# Patient Record
(demographics unavailable — no encounter records)

---

## 2024-11-20 NOTE — PHYSICAL EXAM
Rx Refill Note  Requested Prescriptions     Pending Prescriptions Disp Refills    warfarin (COUMADIN) 4 MG tablet [Pharmacy Med Name: WARFARIN SOD 4MG TABLETS] 64 tablet 0     Sig: Take one tablet by mouth daily on Tuesday Thursday and Saturday and one half tablet by mouth all other days or as directed      Last office visit with prescribing clinician: 8/14/2024   Last telemedicine visit with prescribing clinician: Visit date not found   Next office visit with prescribing clinician: 2/26/2025     Anticoagulation Visit (09/09/2024)     Rhina Chilel LPN  09/11/24, 16:01 EDT   [Normal Appearance] : normal appearance [Well Groomed] : well groomed [General Appearance - In No Acute Distress] : no acute distress [Edema] : no peripheral edema [Respiration, Rhythm And Depth] : normal respiratory rhythm and effort [Exaggerated Use Of Accessory Muscles For Inspiration] : no accessory muscle use [Abdomen Soft] : soft [Abdomen Tenderness] : non-tender [Costovertebral Angle Tenderness] : no ~M costovertebral angle tenderness [Urinary Bladder Findings] : the bladder was normal on palpation [Normal Station and Gait] : the gait and station were normal for the patient's age [] : no rash [No Focal Deficits] : no focal deficits [Oriented To Time, Place, And Person] : oriented to person, place, and time [Affect] : the affect was normal [Mood] : the mood was normal [No Palpable Adenopathy] : no palpable adenopathy

## 2024-11-26 NOTE — PHYSICAL EXAM
[Alert] : alert [No Acute Distress] : no acute distress [Well Nourished] : well nourished [Well Developed] : well developed [Healthy Appearance] : healthy appearance [Normal Voice/Communication] : normal voice communication [No Respiratory Distress] : no respiratory distress [Normal Rate and Effort] : normal respiratory rhythm and effort [Oriented x3] : oriented to person, place, and time [Normal Insight/Judgement] : insight and judgment were intact [Normal Affect] : the affect was normal [Normal Mood] : the mood was normal [Recent Memory Normal] : recent memory was not impaired [Remote Memory Normal] : remote memory was not impaired [Restricted in physically strenuous activity but ambulatory and able to carry out work of a light or sedentary nature] : Restricted in physically strenuous activity but ambulatory and able to carry out work of a light or sedentary nature, e.g., light house work, office work

## 2024-11-27 NOTE — ASSESSMENT
[Other: _____] : [unfilled] [FreeTextEntry1] : This is a 70-year-old male with a PMH of BPH with urinary retention, HLD who presents to IR for consultation for PAE.  # Symptomatic BPH with LUTS / Obstructive Symptoms - states BPH symptoms including weak stream, frequency and nocturia with recent retention requiring orlando catheter and 2 failed TOV - referred for outpatient PAE by Dr Abdul. -IPSS score 4 in office today - PAE procedural risks, benefits & alternatives were discussed at great length with Mr. MICHAEL, inclusive of possible non-targeted embolization (bladder / penis / rectum), post embolization syndrome which may be comprised of flu-like s/s, low grade fever, n/v, pelvic discomfort lasting up to 1 week, penile tip burning after orlando catheter removal which may last a few days or 1-2 episodes of gross hematuria with or without clot passage that may occur monthly as the prostate remodels - performed with sedation from the anesthesia team - Your LUTS may worsen the first week post PAE 2/2 the inflammatory phase of healing - You may also have transitory blood-tinged urine, semen or bowel movements initially - CT abd/pelvis on 11/5 reviewed to evaluate pelvic vasculature for PAE planning - prostate volume measured 129.59 cc - You will be discharged home after a 1- 2 hr PACU stay - an access site closure device (MRI compatible permanent Celt or absorbable Mynx) may be utilized to aid in mitigating postprocedural access site bleeding - You will continue with indwelling orlando catheter with post op f/u & mgmt with Dr. Abdul  - Pending bmp results, Pyridium 100 mg may be ordered x 5 days prn should you experience postprocedural penile / urethral burning, Medrol dose pack or Ibuprofen 600 mg q8h x 5 days may be ordered for postprocedural inflammation / pressure - to be ordered to [facility] DOS by inpatient team - pt denies hx of CKD / renal dysfunction hx - no Ibuprofen post op 2/2 Celebrex - BPH medication regimen & repeat imaging post PAE as per your urologist - IR follow up consults with IPSS assessment s/p PAE will be 1 month post PAE - additional follow ups to be determined as clinically indicated - bkg office to arrange PAE - pt to get cbc, bmp, inr per PST -ok to remain on ASA 81 mg  -Need PST for procedure  #Leukocytosis -WBC 22.43 in ED 11/23/24.  -11/23/24  Urine culture -no growth - Pt reports/ denies fever, chills, cloudy urine -Currently on 10 day course of empiric antibiotics -repeat and trend per PST  #Hyponatremia - ON 11/23 -Repeat and trend per PST  #HTN - controlled off meds - Followed by Dr. Lisker - Last echo EF 59%, 6/2024 - Stress echo normal 6/2024     Mr. HERNANDES comprehension was confirmed & all questions were asked & answered to their satisfaction. IR contact information was reviewed with the pt should there be any questions, issues, or concerns, to be addressed.  IR contact information was provided to the pt should there be any additional questions or concerns that may be addressed & that the booking office will call to schedule PSTs & procedure

## 2024-11-27 NOTE — REASON FOR VISIT
[Consultation] : a consultation visit [Home] : at home, [unfilled] , at the time of the visit. [Medical Office: ([facility])___] : at the medical office located in  [Patient] : the patient [FreeTextEntry1] : PAE

## 2024-11-27 NOTE — REASON FOR VISIT
[Consultation] : a consultation visit [Home] : at home, [unfilled] , at the time of the visit. [Medical Office: ([facility])___] : at the medical office located in  [Patient] : the patient [FreeTextEntry1] : PAE

## 2024-11-27 NOTE — ASSESSMENT
[Other: _____] : [unfilled] [FreeTextEntry1] : This is a 70-year-old male with a PMH of BPH with urinary retention, HLD who presents to IR for consultation for PAE.  # Symptomatic BPH with LUTS / Obstructive Symptoms - states BPH symptoms including weak stream, frequency and nocturia with recent retention requiring orlando catheter and 2 failed TOV - referred for outpatient PAE by Dr Abdul. -IPSS score 4 in office today - PAE procedural risks, benefits & alternatives were discussed at great length with Mr. MICHAEL, inclusive of possible non-targeted embolization (bladder / penis / rectum), post embolization syndrome which may be comprised of flu-like s/s, low grade fever, n/v, pelvic discomfort lasting up to 1 week, penile tip burning after orlando catheter removal which may last a few days or 1-2 episodes of gross hematuria with or without clot passage that may occur monthly as the prostate remodels - performed with sedation from the anesthesia team - Your LUTS may worsen the first week post PAE 2/2 the inflammatory phase of healing - You may also have transitory blood-tinged urine, semen or bowel movements initially - CT abd/pelvis on 11/5 reviewed to evaluate pelvic vasculature for PAE planning - prostate volume measured 129.59 cc - You will be discharged home after a 1- 2 hr PACU stay - an access site closure device (MRI compatible permanent Celt or absorbable Mynx) may be utilized to aid in mitigating postprocedural access site bleeding - You will continue with indwelling orlando catheter with post op f/u & mgmt with Dr. Abdul  - Pending bmp results, Pyridium 100 mg may be ordered x 5 days prn should you experience postprocedural penile / urethral burning, Medrol dose pack or Ibuprofen 600 mg q8h x 5 days may be ordered for postprocedural inflammation / pressure - to be ordered to [facility] pharmacy DOS by inpatient team - pt denies hx of CKD / renal dysfunction hx - no Ibuprofen post op 2/2 Celebrex - BPH medication regimen & repeat imaging post PAE as per your urologist - IR follow up consults with IPSS assessment s/p PAE will be 1 month post PAE - additional follow ups to be determined as clinically indicated - bkg office to arrange PAE - pt to get cbc, bmp, inr per PST -ok to remain on ASA 81 mg  -Need PST for procedure  #Leukocytosis -WBC 22.43 in ED 11/23/24.  -11/23/24  Urine culture -no growth - Pt reports/ denies fever, chills, cloudy urine -Currently on 10 day course of empiric antibiotics -repeat and trend per PST  #Hyponatremia - ON 11/23 -Repeat and trend per PST  #HTN - controlled off meds - Followed by Dr. Lisker - Last echo EF 59%, 6/2024 - Stress echo normal 6/2024     Mr. HERNANDES comprehension was confirmed & all questions were asked & answered to their satisfaction. IR contact information was reviewed with the pt should there be any questions, issues, or concerns, to be addressed.  IR contact information was provided to the pt should there be any additional questions or concerns that may be addressed & that the booking office will call to schedule PSTs & procedure

## 2024-11-27 NOTE — HISTORY OF PRESENT ILLNESS
[0] : ~His/Her~ pain was 0 out of 10 [FreeTextEntry1] : This is a 70-year-old male with a PMH of CAD, HTN, BPH with urinary retention, HLD who presents to IR for consultation for PAE. Referred for outpatient PAE by Dr Abdul.  Pt states approximately 1 month ago he developed hematuria and UTI. He presented to ED on 11/10 for urinary retention and had orlando and then again on 11/23. He reports chronic symptoms of weak stream, nocturia, and frequency. He received a course of antibiotics x 10 days on 11/10 and then again on 11/23 in ED for fever and leukocytosis in ED.  He was placed on oxybutynin for urinary spasms since orlando was placed. Currently denies fever, chills, cloudy urine, hernan bleeding.

## 2025-01-16 NOTE — PHYSICAL EXAM
[Normal Appearance] : normal appearance [Well Groomed] : well groomed [General Appearance - In No Acute Distress] : no acute distress [Edema] : no peripheral edema [Respiration, Rhythm And Depth] : normal respiratory rhythm and effort [Exaggerated Use Of Accessory Muscles For Inspiration] : no accessory muscle use [Abdomen Soft] : soft [Abdomen Tenderness] : non-tender [Costovertebral Angle Tenderness] : no ~M costovertebral angle tenderness [Urinary Bladder Findings] : the bladder was normal on palpation [Normal Station and Gait] : the gait and station were normal for the patient's age [] : no rash [No Focal Deficits] : no focal deficits [Oriented To Time, Place, And Person] : oriented to person, place, and time [Affect] : the affect was normal [Mood] : the mood was normal [No Palpable Adenopathy] : no palpable adenopathy [Chaperone Present] : A chaperone was present in the examining room during all aspects of the physical examination [FreeTextEntry2] : rhys

## 2025-01-27 NOTE — REVIEW OF SYSTEMS
[As Noted in HPI] : as noted in HPI [Negative] : Heme/Lymph [FreeTextEntry5] : + coronary artery calcification

## 2025-01-27 NOTE — ASSESSMENT
[Other: _____] : [unfilled] [FreeTextEntry1] : This is a 71 y/o male with hx of BPH with urinary retention, HLD who presents to IR today for f/u s/p leif PAE on 12/27/24.  IPSS Score today: 0 - was 4 pre PAE  # BPH with LUTS / Obstructive Symptoms - states BPH symptoms including weak stream, frequency and nocturia with recent retention requiring orlando catheter and 2 failed TOV - referred for outpatient PAE by Dr Abdul. - now s/p leif PAE on 12/27/24 -  Mr Hernandes reports orlando was removed 2 wks post PAE & he developed retention & orlando was reinserted in the ED & he developed hematuria & clots - was admitted from 1/10 - 1/13 for CBI - was also txd for UTI. Orlando was removed by Dr Abdul on 1/16 - Today, Mr Hernandes states he is doing well & reports frequency, nocturia & weak stream have resolved since hospital discharge - BPH medication modification, PSA monitoring & imaging as per your urologist - continue with consistent follow ups with your urologist - next f/u with Dr Abdul is 2/13 - no further IR follow ups are required given interval improvement in LUTS / successful orlando removal     Mr. HERNANDES's comprehension was confirmed & all questions were asked & answered to his satisfaction. IR contact information was reviewed with the pt should there be any questions, issues, or concerns, to be addressed.  Above case & plan discussed with Dr Brasher

## 2025-01-27 NOTE — HISTORY OF PRESENT ILLNESS
[0] : ~His/Her~ pain was 0 out of 10 [Other Location: e.g. School (Enter Location, City,State)___] : at [unfilled], at the time of the visit. [Medical Office: ([facility])___] : at the medical office located in  [Verbal consent obtained from patient] : the patient, [unfilled] [FreeTextEntry1] : This is a 69 y/o male with hx of BPH with urinary retention, HLD who presents to IR today for f/u s/p leif PAE on 12/27/24.  Today, Mr Iraheta states he is doing well & reports frequency, nocturia & weak stream have resolved since hospital discharge. States next f/u with Dr Abdul is 2/13.   Mr Iraheta reports oliveira was removed 2 wks post PAE & he developed retention & oliveira was reinserted in the ED & he developed hematuria & clots - was admitted from 1/10 - 1/13 for CBI - was also txd for UTI. Oliveira was removed by Dr Abdul on 1/16.  Developed the flu on 1/14 which resolved after tx with Tamiflu from Urgent Care through 1/18.    Dr. Abdul

## 2025-01-27 NOTE — HISTORY OF PRESENT ILLNESS
[0] : ~His/Her~ pain was 0 out of 10 [Other Location: e.g. School (Enter Location, City,State)___] : at [unfilled], at the time of the visit. [Medical Office: ([facility])___] : at the medical office located in  [Verbal consent obtained from patient] : the patient, [unfilled] [FreeTextEntry1] : This is a 69 y/o male with hx of BPH with urinary retention, HLD who presents to IR today for f/u s/p leif PAE on 12/27/24.  Today, Mr Iraheta states he is doing well & reports frequency, nocturia & weak stream have resolved since hospital discharge. States next f/u with Dr Abdul is 2/13.   Mr Iraheta reports oliveira was removed 2 wks post PAE & he developed retention & oliveira was reinserted in the ED & he developed hematuria & clots - was admitted from 1/10 - 1/13 for CBI - was also txd for UTI. Oliveira was removed by Dr Abdul on 1/16.  Developed the flu on 1/14 which resolved after tx with Tamiflu from Urgent Care through 1/18.    Dr. Abdul

## 2025-01-27 NOTE — ASSESSMENT
[Other: _____] : [unfilled] [FreeTextEntry1] : This is a 69 y/o male with hx of BPH with urinary retention, HLD who presents to IR today for f/u s/p leif PAE on 12/27/24.  IPSS Score today: 0 - was 4 pre PAE  # BPH with LUTS / Obstructive Symptoms - states BPH symptoms including weak stream, frequency and nocturia with recent retention requiring orlando catheter and 2 failed TOV - referred for outpatient PAE by Dr Abdul. - now s/p leif PAE on 12/27/24 -  Mr Hernandes reports orlando was removed 2 wks post PAE & he developed retention & orlando was reinserted in the ED & he developed hematuria & clots - was admitted from 1/10 - 1/13 for CBI - was also txd for UTI. Orlando was removed by Dr Abdul on 1/16 - Today, Mr Hernandes states he is doing well & reports frequency, nocturia & weak stream have resolved since hospital discharge - BPH medication modification, PSA monitoring & imaging as per your urologist - continue with consistent follow ups with your urologist - next f/u with Dr Abdul is 2/13 - no further IR follow ups are required given interval improvement in LUTS / successful orlando removal     Mr. HERNANDES's comprehension was confirmed & all questions were asked & answered to his satisfaction. IR contact information was reviewed with the pt should there be any questions, issues, or concerns, to be addressed.  Above case & plan discussed with Dr Brasher

## 2025-01-27 NOTE — DATA REVIEWED
[FreeTextEntry1] : 11/23/24: WBC 22.43 Hgb 12.5 Hct 37.8 Plt 227 Na 131 K 4.0 BUN 14 Cr 0.80 Left ovarian solid mass, encompassing the entire ovary with a smooth white capsule.  Normal appearing uterine fundus, bilateral fallopian tubes and right ovary.  No evidence of carcinomatosis or ascites.  Normal upper abdominal survey, normal omentum and appendix.  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

## 2025-01-30 NOTE — DISCUSSION/SUMMARY
[FreeTextEntry1] : He is a 70-year-old with recently diagnosed coronary atherosclerosis based on CTA and recent  issues, now resolved.   ECG shows sinus rhythm with RBBB CAD:  No angina. Chronic RBBB. Reduced exercise. Prior: nuclear stress showed no sign of ischemia. He is on an excellent medical regimen including aspirin and high-dose statin.  LDL was at goal. To see Dr. Holliday for blood work. See me anually. [EKG obtained to assist in diagnosis and management of assessed problem(s)] : EKG obtained to assist in diagnosis and management of assessed problem(s)

## 2025-01-30 NOTE — HISTORY OF PRESENT ILLNESS
[FreeTextEntry1] : Had  issues including bleeding causing him to stop aspirin. Now back on asa for 2 weeks. 30 min on the bike daily. No chest pain or dyspena. LDL was at goal.  Prior: Bike at home, down to 20 min almost daily. at the gym does rowing 30 min. no chest pain or dyspnea. LDL 39 No bleeding or bruising. In Dec went to the ER with episodes of low BP. Seen by neurologist. No issues diagnosed.  Prior: He is feeling well. Bicycle daily 30 at high resistance. No chest apoin or dyspnea LDL 50  Prior: Dear Sebastian, Thank you for referring him for cardiovascular evaluation and management.  He is a 68-year-old with recently diagnosed coronary disease based on elevated coronary calcium score and was referred for cardiovascular valuation. He reports feeling okay overall and is able to go about his usual activities without any chest pains or shortness of breath.  He is limited by severe knee arthritic pain. He has no history of diabetes mellitus, smoking, family history of premature coronary artery disease, renal insufficiency or stroke.

## 2025-02-08 NOTE — HISTORY OF PRESENT ILLNESS
[FreeTextEntry1] : 70-year-old male CPE History of coronary artery calcification, elevated PSA, hyperlipidemia, high glucose, and colon polyp [de-identified] :  See "CC" sheet

## 2025-02-08 NOTE — COUNSELING
[de-identified] : Discussed with patient importance of healthy diet, regular exercise (> 2.5 hours/week), and maintaining healthy weight.

## 2025-02-08 NOTE — HEALTH RISK ASSESSMENT
[Fair] : ~his/her~ current health as fair  [Good] : ~his/her~  mood as  good [Intercurrent hospitalizations] : was admitted to the hospital  [Intercurrent Urgi Care visits] : went to urgent care [Yes] : Yes [1 or 2 (0 pts)] : 1 or 2 (0 points) [Never (0 pts)] : Never (0 points) [No] : In the past 12 months have you used drugs other than those required for medical reasons? No [No falls in past year] : Patient reported no falls in the past year [0] : 2) Feeling down, depressed, or hopeless: Not at all (0) [de-identified] : occ. [de-identified] : low fat/salt [de-identified] : bike/walk [[id number]] : 0 [Never] : Never [Patient reported colonoscopy was abnormal] : Patient reported colonoscopy was abnormal [HIV test declined] : HIV test declined [Language] : denies difficulty with language [Change in mental status noted] : No change in mental status noted [Behavior] : denies difficulty with behavior [Learning/Retaining New Information] : denies difficulty learning/retaining new information [Handling Complex Tasks] : denies difficulty handling complex tasks [Reasoning] : denies difficulty with reasoning [Spatial Ability and Orientation] : denies difficulty with spatial ability and orientation [With Family] : lives with family [None] : None [College] : College [] :  [Feels Safe at Home] : Feels safe at home [Fully functional (bathing, dressing, toileting, transferring, walking, feeding)] : Fully functional (bathing, dressing, toileting, transferring, walking, feeding) [Fully functional (using the telephone, shopping, preparing meals, housekeeping, doing laundry, using] : Fully functional and needs no help or supervision to perform IADLs (using the telephone, shopping, preparing meals, housekeeping, doing laundry, using transportation, managing medications and managing finances) [Smoke Detector] : smoke detector [Seat Belt] :  uses seat belt [Designated Healthcare Proxy] : Designated healthcare proxy [Name: ___] : Health Care Proxy's Name: [unfilled]  [Relationship: ___] : Relationship: [unfilled]

## 2025-03-05 NOTE — HISTORY OF PRESENT ILLNESS
[de-identified] : REYES HERNANDES 70 year old male presents for evaluation of B/L knee OA. He had B/L Euflexxa injections in September. He is doing his home exercises recently started going to the gym and uses the stationary bike. He wants another round of B/L Euflexxa injections.

## 2025-03-05 NOTE — CONSULT LETTER
[Dear  ___] : Dear  [unfilled], [Consult Letter:] : I had the pleasure of evaluating your patient, [unfilled]. [Please see my note below.] : Please see my note below. [Consult Closing:] : Thank you very much for allowing me to participate in the care of this patient.  If you have any questions, please do not hesitate to contact me. [Sincerely,] : Sincerely, [FreeTextEntry2] : FRANCESCO FRANCO

## 2025-03-05 NOTE — ADDENDUM
[FreeTextEntry1] : This note was written by Marcellus Hobson on 03/05/2025 acting as scribe for Dr. Kenneth Quiros M.D.   I, Dr. Kenneth Quiros, have read and attest that all the information, medical decision making and discharge instructions within are true and accurate.

## 2025-03-05 NOTE — DISCUSSION/SUMMARY
[de-identified] : Discussed at length the nature of the patients condition. Their bilateral knee symptoms appear secondary to degenerative arthritis. We will continue nonoperative treatment. Since he's had a good prior response, we will schedule for bilateral knee viscosupplementation injections. In the interim, I suggested he continue with home exercise, weight management, and Tylenol prn. They can continue activities as tolerated.

## 2025-03-05 NOTE — PHYSICAL EXAM
[de-identified] : General appearance: well-nourished and hydrated, pleasant, alert and oriented x 3, cooperative. HEENT: Normocephalic, EOM intact, Nasal septum midline, Oral cavity clear, External auditory canal clear. Cardiovascular: no apparent abnormalities, no lower leg edema, no varicosities, pedal pulses are palpable. Lymphatics Lymph nodes: none palpated, Lymphedema: not present. Neurologic: sensation is normal, no muscle weakness in upper lower extremities, patella tendon reflexes intact. Dermatologic no apparent skin lesions, moist, warm, no rash. Spine: cervical spine appears normal and moves freely, thoracic spine appears normal and moves freely, lumbosacral spine appears normal and moves freely. Gait: nonantalgic.  Right knee Inspection: no effusion, no erythema. Wounds: none. Alignment: 10 degrees of varus alignment. Palpation: no tenderness on palpation. ROM active (in degrees): 0-130 with crepitus. Ligamentous laxity: all ligaments appear stable, negative ant. drawer test, negative post. drawer test, stable to varus stress test, stable to valgus stress test. negative Lachman's test, negative pivot shift test Meniscal Test: negative McMurrays, negative Tani. Patellofemoral Alignment Test: Q angle-, normal. Muscle Test: good quad strength.  Left knee Inspection: no effusion or erythema. Wounds: none. Alignment: 10 degrees varus alignment. Palpation: no tenderness on palpation. ROM active (in degrees):  0-130 with crepitus. Ligamentous laxity: all ligaments appear stable, negative ant. drawer test, negative post. drawer test, stable to varus stress test, stable to valgus stress test. negative Lachman's test, negative pivot shift test Meniscal Test: negative McMurrays, negative Tani. Patellofemoral Alignment Test: Q angle-, normal. Muscle Test: good quad strength. [de-identified] : Right knee x-rays, standing AP/Lateral and Merchant films, and 45 degree PA standing view, taken at the office today show diffuse tricompartmental degenerative arthritis, tibial subluxation, varus deformity, medial joint space narrowing, medial bone on bone, sclerosis, marginal osteophytes, patella sits centrally with patellofemoral joint space narrowing, Kellgren and Oumar grade 4.  Left knee x-rays, standing AP/Lateral and Merchant films, and 45 degree PA standing view, taken at the office today show diffuse tricompartmental degenerative arthritis, tibial subluxation, varus deformity, medial joint space narrowing, medial bone on bone, sclerosis, marginal osteophytes, patella sits centrally with patellofemoral joint space narrowing, Kellgren and Oumar grade 4.

## 2025-03-25 NOTE — ASSESSMENT
[FreeTextEntry1] : wax- Rx:  Debrox was prescribed and  is to be placed in both ears on a routine basis to keep them free of wax. Routine debridement suggested to keep the ears free of wax.  bilateral sensorineural hearing loss-cleared for hearing aids Assym SNHL Rec MRI of IAC's  tinnitus acupucture  f/u 4-6 months

## 2025-03-25 NOTE — HISTORY OF PRESENT ILLNESS
[de-identified] : 69 yo Bilat Tinnitus L>R no noted HL or vertigo No h/o trauma no other modifying factors no other nasal or throat complaints

## 2025-03-25 NOTE — DATA REVIEWED
[de-identified] : Hearing Test performed to evaluate the extent of hearing loss and  to explain pt's symptoms  was personally reviewed and revealed Tymps-wnl Hearing-bilat SNHL L>R

## 2025-04-02 NOTE — PROCEDURE
[de-identified] : Euflexxa # 1 Bilateral knee Discussed at length with the patient the planned Euflexxa injection. The risks, benefits, convalescence and alternatives were reviewed. The possible side effects discussed included but were not limited to: pain, swelling, heat and redness. There symptoms are generally mild but if they are extensive then contact the office. Giving pain relievers by mouth such as NSAIDs or Tylenol can generally treat the reactions to Euflexxa. Rare cases of infection have been noted. Rash, hives and itching may occur post injection. If you have muscle pain or cramps, flushing and or swelling of the face, rapid heart beat, nausea, dizziness, fever, chills, headache, difficulty breathing, swelling in the arms or legs, or have a prickly feeling of your skin, contact a health care provider immediately.  Following this discussion, the knee was prepped with betadine and under sterile condition the Euflexxa injection was performed with a 22 gauge needle. The needle was introduced into the joint, aspiration was performed to ensure intra-articular placement and the medication was injected. Upon withdrawal of the needle the site was cleaned with alcohol and a bandaid applied. The patient tolerated the injection well and there were no adverse effects. Post injection instructions included no strenuous activity for 24 hours, cryotherapy and if there are any adverse effects to contact the office.

## 2025-04-10 NOTE — PROCEDURE
[de-identified] : Euflexxa # 2 Bilateral Knee Discussed at length with the patient the planned Euflexxa injection. The risks, benefits, convalescence and alternatives were reviewed. The possible side effects discussed included but were not limited to: pain, swelling, heat and redness. There symptoms are generally mild but if they are extensive then contact the office. Giving pain relievers by mouth such as NSAIDs or Tylenol can generally treat the reactions to Euflexxa. Rare cases of infection have been noted. Rash, hives and itching may occur post injection. If you have muscle pain or cramps, flushing and or swelling of the face, rapid heart beat, nausea, dizziness, fever, chills, headache, difficulty breathing, swelling in the arms or legs, or have a prickly feeling of your skin, contact a health care provider immediately.  Following this discussion, the knee was prepped with betadine and under sterile condition the Euflexxa injection was performed with a 22 gauge needle. The needle was introduced into the joint, aspiration was performed to ensure intra-articular placement and the medication was injected. Upon withdrawal of the needle the site was cleaned with alcohol and a bandaid applied. The patient tolerated the injection well and there were no adverse effects. Post injection instructions included no strenuous activity for 24 hours, cryotherapy and if there are any adverse effects to contact the office.

## 2025-04-10 NOTE — PROCEDURE
[de-identified] : Euflexxa # 2 Bilateral Knee Discussed at length with the patient the planned Euflexxa injection. The risks, benefits, convalescence and alternatives were reviewed. The possible side effects discussed included but were not limited to: pain, swelling, heat and redness. There symptoms are generally mild but if they are extensive then contact the office. Giving pain relievers by mouth such as NSAIDs or Tylenol can generally treat the reactions to Euflexxa. Rare cases of infection have been noted. Rash, hives and itching may occur post injection. If you have muscle pain or cramps, flushing and or swelling of the face, rapid heart beat, nausea, dizziness, fever, chills, headache, difficulty breathing, swelling in the arms or legs, or have a prickly feeling of your skin, contact a health care provider immediately.  Following this discussion, the knee was prepped with betadine and under sterile condition the Euflexxa injection was performed with a 22 gauge needle. The needle was introduced into the joint, aspiration was performed to ensure intra-articular placement and the medication was injected. Upon withdrawal of the needle the site was cleaned with alcohol and a bandaid applied. The patient tolerated the injection well and there were no adverse effects. Post injection instructions included no strenuous activity for 24 hours, cryotherapy and if there are any adverse effects to contact the office.

## 2025-04-16 NOTE — PROCEDURE
[de-identified] : Euflexxa #3 Bilateral knees Discussed at length with the patient the planned Euflexxa injection. The risks, benefits, convalescence and alternatives were reviewed. The possible side effects discussed included but were not limited to: pain, swelling, heat and redness. There symptoms are generally mild but if they are extensive then contact the office. Giving pain relievers by mouth such as NSAIDs or Tylenol can generally treat the reactions to Euflexxa. Rare cases of infection have been noted. Rash, hives and itching may occur post injection. If you have muscle pain or cramps, flushing and or swelling of the face, rapid heart beat, nausea, dizziness, fever, chills, headache, difficulty breathing, swelling in the arms or legs, or have a prickly feeling of your skin, contact a health care provider immediately.  Following this discussion, the knee was prepped with betadine and under sterile condition the Euflexxa injection was performed with a 22 gauge needle. The needle was introduced into the joint, aspiration was performed to ensure intra-articular placement and the medication was injected. Upon withdrawal of the needle the site was cleaned with alcohol and a bandaid applied. The patient tolerated the injection well and there were no adverse effects. Post injection instructions included no strenuous activity for 24 hours, cryotherapy and if there are any adverse effects to contact the office.

## 2025-04-16 NOTE — PROCEDURE
[de-identified] : Euflexxa #3 Bilateral knees Discussed at length with the patient the planned Euflexxa injection. The risks, benefits, convalescence and alternatives were reviewed. The possible side effects discussed included but were not limited to: pain, swelling, heat and redness. There symptoms are generally mild but if they are extensive then contact the office. Giving pain relievers by mouth such as NSAIDs or Tylenol can generally treat the reactions to Euflexxa. Rare cases of infection have been noted. Rash, hives and itching may occur post injection. If you have muscle pain or cramps, flushing and or swelling of the face, rapid heart beat, nausea, dizziness, fever, chills, headache, difficulty breathing, swelling in the arms or legs, or have a prickly feeling of your skin, contact a health care provider immediately.  Following this discussion, the knee was prepped with betadine and under sterile condition the Euflexxa injection was performed with a 22 gauge needle. The needle was introduced into the joint, aspiration was performed to ensure intra-articular placement and the medication was injected. Upon withdrawal of the needle the site was cleaned with alcohol and a bandaid applied. The patient tolerated the injection well and there were no adverse effects. Post injection instructions included no strenuous activity for 24 hours, cryotherapy and if there are any adverse effects to contact the office.

## 2025-06-13 NOTE — ADDENDUM
[FreeTextEntry1] : This note was written by Zoltan Varlea on 06/13/2025 acting as scribe for Dr. Kenneth Quiros M.D.  I, Dr. Kenneth Quiros, have read and attest that all the information, medical decision making and discharge instructions within are true and accurate.

## 2025-06-13 NOTE — PHYSICAL EXAM
[de-identified] : General appearance: well-nourished and hydrated, pleasant, alert and oriented x 3, cooperative. HEENT: Normocephalic, EOM intact, Nasal septum midline, Oral cavity clear, External auditory canal clear. Cardiovascular: no apparent abnormalities, no lower leg edema, no varicosities, pedal pulses are palpable. Lymphatics Lymph nodes: none palpated, Lymphedema: not present. Neurologic: sensation is normal, no muscle weakness in upper lower extremities, patella tendon reflexes intact. Dermatologic no apparent skin lesions, moist, warm, no rash. Spine: cervical spine appears normal and moves freely, thoracic spine appears normal and moves freely, lumbosacral spine appears normal and moves freely. Gait: nonantalgic. Walks with BL lateral thrust   Right knee Inspection: trace effusion. Wounds: none. Alignment: 15 degrees of varus alignment. Palpation: medial tenderness on palpation. ROM active (in degrees): 0-130 with crepitus and discomfort Ligamentous laxity: all ligaments appear stable, negative ant. drawer test, negative post. drawer test, stable to varus stress test, stable to valgus stress test. negative Lachman's test, negative pivot shift test Meniscal Test: negative McMurrays, negative Tani. Patellofemoral Alignment Test: Q angle-, normal. Muscle Test: good quad strength.  Left knee Inspection: trace effusion. Wounds: none. Alignment: 15 degrees varus alignment. Palpation: no tenderness on palpation. ROM active (in degrees):  0-130 with crepitus and discomfort Ligamentous laxity: all ligaments appear stable, negative ant. drawer test, negative post. drawer test, stable to varus stress test, stable to valgus stress test. negative Lachman's test, negative pivot shift test Meniscal Test: negative McMurrays, negative Tani. Patellofemoral Alignment Test: Q angle-, normal. Muscle Test: good quad strength. [de-identified] : Right knee x-rays, standing AP/Lateral and Merchant films, and 45-degree PA standing view, taken at the office today shows diffuse tricompartmental degenerative arthritis, Varus deformity, joint space narrowing, marginal osteophytes, medial bone on bone sclerosis, patellofemoral joint space narrowing, peripheral osteophytes, Kellgren and Oumar grade 4.   Left knee x-rays, standing AP/Lateral and Merchant films, and 45-degree PA standing view, taken at the office today shows diffuse tricompartmental degenerative arthritis, Varus deformity, joint space narrowing, marginal osteophytes, medial bone on bone sclerosis, patellofemoral joint space narrowing, peripheral osteophytes, Kellgren and Oumar grade 4.

## 2025-06-13 NOTE — HISTORY OF PRESENT ILLNESS
[de-identified] :  REYES HERNANDES 70 year old male presents for follow up evaluation of BL knee pain related to arthritis. He did have BL Euflexxa injection in April this year. He states thtat he was doing well until about 2.5 weeks ago when his right knee became symptomatic. He does use the Tylenol for pain relief. He is ready to discuss a right knee TKR today. He does not have any major medical problems other than increased cholesterol.

## 2025-06-13 NOTE — DISCUSSION/SUMMARY
[de-identified] : Discussed at length the nature of the patient's condition. Their BL knee symptoms appear secondary to severe degenerative arthritis with the right knee being more symptomatic. I have discussed the natura history of the condition with the patient. He has failed all non-operative treatment plans at this time. Discussed at length the natural history of degenerative arthritis and reviewed non-operative and operative treatment. Prior non-operative treatment for more than 3 months by either myself or prior treating physicians, including NSAIDs, injection therapy, a structured exercise program or physiotherapy and weight management has not resolved the condition. Due to the pain and associated functional disability that impacts all appropriate activities of daily living, I recommend a staged bilateral total knee replacement, the RIGHT side would be done first. A thorough discussion regarding the risks, benefits, convalescence and alternatives were reviewed. The risks can include but are not limited to: anesthesia risk, bleeding wit possible transfusion, nerve injury, infection, revision surgery due to implant failure, bone fracture, deep vein thrombosis, cardiac failure, pneumonia, and respiratory distress. The patient's expectations were reviewed and compared to the surgeon's expectations. This allowed for a discussion regarding reasonable expectations for functional improvement, pain relief, and return to normal activities of daily living. Numerous questions were asked and answered to their satisfaction. The patient was involved in the decision-making process - we discussed implant choice and fixation along with all details of TKA. Models were used as an educational tool. Surgery will be scheduled at a convenient time.   Preop medical and cardiac clearance.  Patient can continue home exercises, Tylenol, weight management and activities as tolerated. All questions were answered, understanding verbalized. Patient is in agreement with plan of treatment. Patient may follow up as needed.

## 2025-07-22 NOTE — ASSESSMENT
[FreeTextEntry1] : wax- Rx:  Debrox was prescribed and  is to be placed in both ears on a routine basis to keep them free of wax. Routine debridement suggested to keep the ears free of wax.  bilateral sensorineural hearing loss-cleared for hearing aids Assym SNHL-stable on audiogram today MRI of IAC's-wnl  tinnitus acupucture  Derm eval  f/u annual

## 2025-07-22 NOTE — DATA REVIEWED
[de-identified] : Hearing Test performed to evaluate the extent of hearing loss and  to explain pt's symptoms  was personally reviewed and revealed Tymps-wnl Hearing-bilat SNHL L>R [de-identified] : MRI-IAC's-no masses

## 2025-07-22 NOTE — HISTORY OF PRESENT ILLNESS
[de-identified] : 71 yo Bilat Tinnitus L>R no noted HL or vertigo No h/o trauma no other modifying factors no other nasal or throat complaints [FreeTextEntry1] : 7/22/2025 nochange in assym hearing Is seeing a Derm MD for skin eval no  vetigo no other modifying factors no other nasal or throat complaints

## 2025-07-22 NOTE — PROCEDURE
[FreeTextEntry3] : Procedure - Cerumen Removal. Indication - Obstructing visualization of TM After informed verbal consent is obtained, cerumen was removed from the  both        ear canal(s) with a curette and suction.  Normal appearing canal(s) following removal.

## 2025-07-30 NOTE — PHYSICAL EXAM
[Normal Appearance] : normal appearance [Well Groomed] : well groomed [General Appearance - In No Acute Distress] : no acute distress [Edema] : no peripheral edema [Respiration, Rhythm And Depth] : normal respiratory rhythm and effort [Exaggerated Use Of Accessory Muscles For Inspiration] : no accessory muscle use [Abdomen Soft] : soft [Abdomen Tenderness] : non-tender [Costovertebral Angle Tenderness] : no ~M costovertebral angle tenderness [Urinary Bladder Findings] : the bladder was normal on palpation [Normal Station and Gait] : the gait and station were normal for the patient's age [] : no rash [No Focal Deficits] : no focal deficits [Oriented To Time, Place, And Person] : oriented to person, place, and time [Affect] : the affect was normal [Mood] : the mood was normal [No Palpable Adenopathy] : no palpable adenopathy [Chaperone Present] : A chaperone was present in the examining room during all aspects of the physical examination [FreeTextEntry2] : gonzales